# Patient Record
Sex: FEMALE | Race: WHITE | NOT HISPANIC OR LATINO | Employment: OTHER | URBAN - METROPOLITAN AREA
[De-identification: names, ages, dates, MRNs, and addresses within clinical notes are randomized per-mention and may not be internally consistent; named-entity substitution may affect disease eponyms.]

---

## 2019-07-09 ENCOUNTER — APPOINTMENT (OUTPATIENT)
Dept: CT IMAGING | Facility: HOSPITAL | Age: 84
End: 2019-07-09

## 2019-07-09 ENCOUNTER — HOSPITAL ENCOUNTER (EMERGENCY)
Facility: HOSPITAL | Age: 84
Discharge: HOME | End: 2019-07-09
Attending: EMERGENCY MEDICINE | Admitting: EMERGENCY MEDICINE

## 2019-07-09 VITALS
RESPIRATION RATE: 16 BRPM | OXYGEN SATURATION: 97 % | TEMPERATURE: 97.6 F | HEART RATE: 76 BPM | SYSTOLIC BLOOD PRESSURE: 180 MMHG | DIASTOLIC BLOOD PRESSURE: 84 MMHG

## 2019-07-09 DIAGNOSIS — S00.03XA CONTUSION OF SCALP, INITIAL ENCOUNTER: Primary | ICD-10-CM

## 2019-07-09 DIAGNOSIS — S16.1XXA STRAIN OF NECK MUSCLE, INITIAL ENCOUNTER: ICD-10-CM

## 2019-07-09 PROCEDURE — 70450 CT HEAD/BRAIN W/O DYE: CPT

## 2019-07-09 PROCEDURE — 72125 CT NECK SPINE W/O DYE: CPT

## 2019-07-09 PROCEDURE — 99283 EMERGENCY DEPT VISIT LOW MDM: CPT

## 2019-07-09 RX ORDER — AMLODIPINE BESYLATE 5 MG/1
5 TABLET ORAL DAILY
COMMUNITY

## 2019-07-09 RX ORDER — INSULIN GLARGINE 100 [IU]/ML
INJECTION, SOLUTION SUBCUTANEOUS DAILY
COMMUNITY

## 2019-07-09 RX ORDER — DIAPER,BRIEF,INFANT-TODD,DISP
EACH MISCELLANEOUS 2 TIMES DAILY
COMMUNITY

## 2019-07-09 RX ORDER — LOSARTAN POTASSIUM 25 MG/1
100 TABLET ORAL DAILY
COMMUNITY

## 2019-07-09 NOTE — DISCHARGE INSTRUCTIONS
Continue current home medications  Activity as tolerated  Follow up with pmd as needed  Return to er for fever, chills, vomiting, change in mental status, or any new or worsening symptoms

## 2019-07-09 NOTE — ED NOTES
Douglas called to Linda FABIAN? At [facility]. Discussed pt's current BP of 180/84. Linda states that they will accept the pt with this and treat when she arrives.      Joshua Oneill RN  07/09/19 0438

## 2019-07-09 NOTE — ED PROVIDER NOTES
EMERGENCY DEPARTMENT ENCOUNTER    CHIEF COMPLAINT  Chief Complaint: headache  History given by: pt, EMS  History limited by:dementia  Time Seen: 0045  Room Number: 27/27  PMD: Jesus Haq DO      HPI:  Pt is a 86 y.o. female who presents with a mild headache starting after two falls at 2000 and 2300 this evening, respectively. Her hx is limited by dementia. Per EMS, pt first fell out of her wheelchair and then out of her bed. Patient denies pain and all other complaints at this time.    Duration: 4 hours  Timing:cnstant  Location:head  Radiation:none  Quality:ache  Intensity/Severity:mild  Progression:unchanged  Associated Symptoms:unable to obtain  Aggravating Factors:unknown  Alleviating Factors:unknown  Previous Episodes:unknown  Treatment before arrival:unknown    PAST MEDICAL HISTORY  Active Ambulatory Problems     Diagnosis Date Noted   • No Active Ambulatory Problems     Resolved Ambulatory Problems     Diagnosis Date Noted   • No Resolved Ambulatory Problems     Past Medical History:   Diagnosis Date   • Diabetes mellitus (CMS/[facility])    • Hypertension        PAST SURGICAL HISTORY  History reviewed. No pertinent surgical history.    FAMILY HISTORY  History reviewed. No pertinent family history.    SOCIAL HISTORY  Social History     Socioeconomic History   • Marital status: Single     Spouse name: Not on file   • Number of children: Not on file   • Years of education: Not on file   • Highest education level: Not on file         ALLERGIES  Penicillins    REVIEW OF SYSTEMS  Review of Systems   Unable to perform ROS: Dementia   Neurological: Positive for headaches.       PHYSICAL EXAM  ED Triage Vitals [07/09/19 0016]   Temp Heart Rate Resp BP SpO2   97.6 °F (36.4 °C) 91 16 142/76 97 %       Physical Exam   Constitutional: She is well-developed, well-nourished, and in no distress.   Pleasantly confused.   HENT:   Head: Normocephalic.   Mouth/Throat: Mucous membranes are normal.   Eyes: No scleral icterus.    Neck: Normal range of motion.   No C, T, or L spine tenderness.   Cardiovascular: Normal rate, regular rhythm and normal heart sounds.   Pulmonary/Chest: Effort normal and breath sounds normal.   Musculoskeletal: Normal range of motion.   Neurological: She is alert.   Oriented only to person.   Skin: Skin is warm and dry.   Psychiatric: Mood and affect normal.   Nursing note and vitals reviewed.      LAB RESULTS  No results found for this or any previous visit (from the past 24 hour(s)).    I ordered the above labs and reviewed the results    RADIOLOGY  CT Cervical Spine Without Contrast   Final Result   1. No acute osseous finding       This report was finalized on 7/9/2019 1:35 AM by Garrett Nagel M.D.          CT Head Without Contrast   Final Result   1. No acute intracranial abnormality.                       This report was finalized on 7/9/2019 1:31 AM by Garrett Nagel M.D.              I ordered the above noted radiological studies and reviewed the images on the PACS system.        PROGRESS AND CONSULTS    0100. Reviewed pt's history and workup with Dr. Shore.  At bedside evaluation, they agree with the plan of care and discharge.     Reviewed implications of results, diagnosis, meds, responsibility to follow up, warning signs and symptoms of possible worsening, potential complications and reasons to return to ER with patient.  Discussed all results and noted any abnormalities with Nursing Home.           DIAGNOSIS  Final diagnoses:   Contusion of scalp, initial encounter   Strain of neck muscle, initial encounter       FOLLOW UP   Jesus Haq,   [address] [facility] [id number] [phone]    Schedule an appointment as soon as possible for a visit in 3 days  If symptoms worsen, As needed        COURSE & MEDICAL DECISION MAKING  Pertinent Imaging studies that were ordered and reviewed are noted above.  Results were reviewed/discussed with the patient and they were also made aware of online  assess.       MEDICATIONS GIVEN IN ER  Medications - No data to display    /76   Pulse 91   Temp 97.6 °F (36.4 °C) (Tympanic)   Resp 16   SpO2 97%       I personally reviewed the past medical history, past surgical history, social history, family history, current medications and allergies as they appear in this chart.  The scribe's note accurately reflects the work and decisions made by me.     Documentation assistance provided by alessandro Gilbert for PELON Hobbs on 7/9/2019 at 1:47 AM. Information recorded by the scribe was done at my direction and has been verified and validated by me.     Harriett Gilbert  07/09/19 0149       Jessica Snyder APRN  07/09/19 0239

## 2019-07-09 NOTE — ED PROVIDER NOTES
Pt presents to ED c/o mild neck pain that began earlier tonight when pt fell out of bed. Hx of dementia.    Upon exam, pt history is limited by her chronic dementia, she c/o neck pain, but there is no obvious deformity or signs of external trauma.    Discussed with pt plan to obtain CTs prior to disposition. Pt understands and agrees with the plan, all questions answered.    MD ATTESTATION NOTE    The GERMAN and I have discussed this patient's history, physical exam, and treatment plan.  I have reviewed the documentation and personally had a face to face interaction with the patient. I affirm the documentation and agree with the treatment and plan.  The attached note describes my personal findings.    Documentation assistance provided by alessandro Briggs for Dr. Shore.  Information recorded by the scribe was done at my direction and has been verified and validated by me.     Maribel Briggs  07/09/19 0135       Sean Shore MD  07/09/19 0314

## 2019-07-09 NOTE — ED TRIAGE NOTES
To ER via EMS from [facility].  Per staff, pt fell getting out of wheelchair at approx 2015.  Pt c/o head pain at this time.  Pt was observing pt.  Pt then fell out of bed at approx 2330 and again complained of head pain.  Facility decided to send resident in for evaluation.

## 2020-01-01 ENCOUNTER — HOSPITAL ENCOUNTER (EMERGENCY)
Facility: HOSPITAL | Age: 85
End: 2020-07-22
Attending: EMERGENCY MEDICINE | Admitting: EMERGENCY MEDICINE

## 2020-01-01 VITALS
RESPIRATION RATE: 32 BRPM | OXYGEN SATURATION: 87 % | TEMPERATURE: 97.6 F | SYSTOLIC BLOOD PRESSURE: 65 MMHG | DIASTOLIC BLOOD PRESSURE: 38 MMHG

## 2020-01-01 DIAGNOSIS — Z66 DNR (DO NOT RESUSCITATE): ICD-10-CM

## 2020-01-01 DIAGNOSIS — J96.00 ACUTE RESPIRATORY FAILURE DUE TO COVID-19 (HCC): Primary | ICD-10-CM

## 2020-01-01 DIAGNOSIS — U07.1 ACUTE RESPIRATORY FAILURE DUE TO COVID-19 (HCC): Primary | ICD-10-CM

## 2020-01-01 LAB

## 2020-01-01 PROCEDURE — 25010000002 MORPHINE PER 10 MG: Performed by: EMERGENCY MEDICINE

## 2020-01-01 PROCEDURE — 99285 EMERGENCY DEPT VISIT HI MDM: CPT

## 2020-01-01 PROCEDURE — 25010000002 ONDANSETRON PER 1 MG: Performed by: EMERGENCY MEDICINE

## 2020-01-01 PROCEDURE — 0202U NFCT DS 22 TRGT SARS-COV-2: CPT | Performed by: EMERGENCY MEDICINE

## 2020-01-01 PROCEDURE — 96374 THER/PROPH/DIAG INJ IV PUSH: CPT

## 2020-01-01 PROCEDURE — 96375 TX/PRO/DX INJ NEW DRUG ADDON: CPT

## 2020-01-01 RX ORDER — ACETAMINOPHEN 325 MG/1
650 TABLET ORAL EVERY 4 HOURS PRN
Status: CANCELLED | OUTPATIENT
Start: 2020-01-01

## 2020-01-01 RX ORDER — ONDANSETRON 2 MG/ML
4 INJECTION INTRAMUSCULAR; INTRAVENOUS ONCE
Status: COMPLETED | OUTPATIENT
Start: 2020-01-01 | End: 2020-01-01

## 2020-01-01 RX ORDER — MORPHINE SULFATE 20 MG/ML
20 SOLUTION ORAL
Status: CANCELLED | OUTPATIENT
Start: 2020-01-01 | End: 2020-08-01

## 2020-01-01 RX ORDER — LORAZEPAM 2 MG/ML
2 CONCENTRATE ORAL
Status: CANCELLED | OUTPATIENT
Start: 2020-01-01 | End: 2020-08-01

## 2020-01-01 RX ORDER — MORPHINE SULFATE 2 MG/ML
2 INJECTION, SOLUTION INTRAMUSCULAR; INTRAVENOUS
Status: CANCELLED | OUTPATIENT
Start: 2020-01-01 | End: 2020-08-01

## 2020-01-01 RX ORDER — MORPHINE SULFATE 20 MG/ML
5 SOLUTION ORAL
Status: CANCELLED | OUTPATIENT
Start: 2020-01-01 | End: 2020-08-01

## 2020-01-01 RX ORDER — MORPHINE SULFATE 2 MG/ML
4 INJECTION, SOLUTION INTRAMUSCULAR; INTRAVENOUS
Status: CANCELLED | OUTPATIENT
Start: 2020-01-01 | End: 2020-08-01

## 2020-01-01 RX ORDER — LORAZEPAM 2 MG/ML
0.5 CONCENTRATE ORAL
Status: CANCELLED | OUTPATIENT
Start: 2020-01-01 | End: 2020-08-01

## 2020-01-01 RX ORDER — MORPHINE SULFATE 2 MG/ML
4 INJECTION, SOLUTION INTRAMUSCULAR; INTRAVENOUS ONCE
Status: COMPLETED | OUTPATIENT
Start: 2020-01-01 | End: 2020-01-01

## 2020-01-01 RX ORDER — ACETAMINOPHEN 160 MG/5ML
650 SOLUTION ORAL EVERY 4 HOURS PRN
Status: CANCELLED | OUTPATIENT
Start: 2020-01-01

## 2020-01-01 RX ORDER — MORPHINE SULFATE 10 MG/ML
6 INJECTION INTRAMUSCULAR; INTRAVENOUS; SUBCUTANEOUS
Status: CANCELLED | OUTPATIENT
Start: 2020-01-01 | End: 2020-08-01

## 2020-01-01 RX ORDER — HYDROMORPHONE HYDROCHLORIDE 1 MG/ML
0.5 INJECTION, SOLUTION INTRAMUSCULAR; INTRAVENOUS; SUBCUTANEOUS
Status: CANCELLED | OUTPATIENT
Start: 2020-01-01 | End: 2020-08-01

## 2020-01-01 RX ORDER — LORAZEPAM 2 MG/ML
2 INJECTION INTRAMUSCULAR
Status: CANCELLED | OUTPATIENT
Start: 2020-01-01 | End: 2020-08-01

## 2020-01-01 RX ORDER — LORAZEPAM 2 MG/ML
0.5 INJECTION INTRAMUSCULAR
Status: CANCELLED | OUTPATIENT
Start: 2020-01-01 | End: 2020-08-01

## 2020-01-01 RX ORDER — ACETAMINOPHEN 650 MG/1
650 SUPPOSITORY RECTAL EVERY 4 HOURS PRN
Status: CANCELLED | OUTPATIENT
Start: 2020-01-01

## 2020-01-01 RX ORDER — DIPHENOXYLATE HYDROCHLORIDE AND ATROPINE SULFATE 2.5; .025 MG/1; MG/1
1 TABLET ORAL
Status: CANCELLED | OUTPATIENT
Start: 2020-01-01

## 2020-01-01 RX ORDER — LORAZEPAM 2 MG/ML
1 CONCENTRATE ORAL
Status: CANCELLED | OUTPATIENT
Start: 2020-01-01 | End: 2020-08-01

## 2020-01-01 RX ORDER — LORAZEPAM 2 MG/ML
1 INJECTION INTRAMUSCULAR
Status: CANCELLED | OUTPATIENT
Start: 2020-01-01 | End: 2020-08-01

## 2020-01-01 RX ORDER — MORPHINE SULFATE 20 MG/ML
10 SOLUTION ORAL
Status: CANCELLED | OUTPATIENT
Start: 2020-01-01 | End: 2020-08-01

## 2020-01-01 RX ADMIN — MORPHINE SULFATE 4 MG: 2 INJECTION, SOLUTION INTRAMUSCULAR; INTRAVENOUS at 10:14

## 2020-01-01 RX ADMIN — ONDANSETRON 4 MG: 2 INJECTION INTRAMUSCULAR; INTRAVENOUS at 10:14

## 2020-07-22 PROBLEM — J96.00 ACUTE RESPIRATORY FAILURE DUE TO COVID-19 (HCC): Status: ACTIVE | Noted: 2020-01-01

## 2020-07-22 PROBLEM — U07.1 ACUTE RESPIRATORY FAILURE DUE TO COVID-19 (HCC): Status: ACTIVE | Noted: 2020-01-01

## 2020-07-22 NOTE — ED NOTES
Notified Neal Smith of pt expiration.  Spoke with Mary and left message with Elsy Ayers at [facility] facility     Adelita Mora RN  07/22/20 2633

## 2020-07-22 NOTE — ED PROVIDER NOTES
EMERGENCY DEPARTMENT ENCOUNTER    Room Number:  18/18  Date of encounter:  7/22/2020  PCP: Jesus Haq DO  Historian: EMS      HPI:  Chief Complaint: respiratory distress    A complete HPI/ROS/PMH/PSH/SH/FH are unobtainable due to: pt condition    Context: Sheree Maza is a 87 y.o. female with hx of dementia who presents to the ED from the NH via EMS c/o respiratory distress.  Oxygen saturations and blood pressure was in the 60s upon arrival.  Pt is a DNR.        PAST MEDICAL HISTORY  Active Ambulatory Problems     Diagnosis Date Noted   • No Active Ambulatory Problems     Resolved Ambulatory Problems     Diagnosis Date Noted   • No Resolved Ambulatory Problems     Past Medical History:   Diagnosis Date   • COVID-19    • Diabetes mellitus (CMS/HCC)    • Hypertension          PAST SURGICAL HISTORY  History reviewed. No pertinent surgical history.      FAMILY HISTORY  History reviewed. No pertinent family history.      SOCIAL HISTORY  Social History     Socioeconomic History   • Marital status: Single     Spouse name: Not on file   • Number of children: Not on file   • Years of education: Not on file   • Highest education level: Not on file         ALLERGIES  Penicillins        REVIEW OF SYSTEMS  Review of Systems   Unable to perform ROS: Acuity of condition       All systems reviewed and negative except for those discussed in HPI.     PHYSICAL EXAM    I have reviewed the triage vital signs and nursing notes.    ED Triage Vitals   Temp Pulse Resp BP SpO2   -- -- -- -- --      Temp src Heart Rate Source Patient Position BP Location FiO2 (%)   -- -- -- -- --       GENERAL: Pt is unresponsive and in severe respiratory distress  HENT: NCAT, neck supple, trachea midline, dry mucous membranes and cracked lips  EYES: no scleral icterus, pupils are 5mm, she has a R-sided gaze preference  CV: regular rhythm, tachycardic rate in the 130s, no murmur  RESPIRATORY: rhonchi and decreased breath sounds in all lung  fields  ABDOMEN: soft, non-distended, bowel sounds present  MUSCULOSKELETAL: no gross deformity, no pedal edema  NEURO: no response to painful stimuli  SKIN: BLE are cool and cyanotic  PSYCH:  Unresponsive      PPE  Patient was placed in face mask in first look. Patient was wearing an oxygen facemask when I entered the room and throughout our encounter. I wore full protective equipment throughout this patient encounter including a CAPR  face mask eye shield, gown and gloves. Hand hygiene was performed before donning protective equipment and after removal when leaving the room.      Vital signs and nursing notes reviewed.      LAB RESULTS  No results found for this or any previous visit (from the past 24 hour(s)).    Ordered the above labs and independently reviewed the results.        RADIOLOGY  No Radiology Exams Resulted Within Past 24 Hours    I ordered the above noted radiological studies. Independently reviewed by me and discussed with radiologist.  See dictation above for official radiology interpretation.      PROCEDURES    Procedures        MEDICATIONS GIVEN IN ER    Medications   morphine injection 4 mg (4 mg Intravenous Given 7/22/20 1014)   ondansetron (ZOFRAN) injection 4 mg (4 mg Intravenous Given 7/22/20 1014)         PROGRESS, DATA ANALYSIS, CONSULTS, AND MEDICAL DECISION MAKING    All labs have been independently reviewed by me.  All radiology studies have been reviewed by me and discussed with radiologist dictating report.   EKG's independently reviewed by me.  Discussion below represents my analysis of pertinent findings related to patient's condition, differential diagnosis, treatment plan and final disposition.    0950:  Attempted to call POA but was sent directly to voicemail.      0955:  Spoke to Michelle Adhikarikman, the pt's sister and POA on the phone who states that pt is positive for COVID-19 and has been in a COVID-19 unit for the past week.  She confirms that pt is a DNR and that she would like  comfort care only moving forward.      1007:  Ordered Morphine and Zofran.      1013: Received call from Dr. Franco and discussed pt's case.  Dr. Franco will accept the pt to palliative care.  Pt will remain in isolation in the ED until being taken to palliative care.      1227: Rechecked the patient.  Pt is apneic and pulsless in asystole.  Time of death 1227.  Called pt's sister and informed her that pt has .       DIAGNOSIS  Final diagnoses:   Acute respiratory failure due to COVID-19 (CMS/[facility])   DNR (do not resuscitate)         DISPOSITION         EMR Dragon/Transcription disclaimer:   Much of this encounter note is an electronic transcription/translation of spoken language to printed text. The electronic translation of spoken language may permit erroneous, or at times, nonsensical words or phrases to be inadvertently transcribed; Although I have reviewed the note for such errors, some may still exist.       --  Documentation assistance provided by alessandro Retana for Dr. Jeff MD.  Information recorded by the scribe was done at my direction and has been verified and validated by me.         Isaak Retana  20 [phone]       Raad Aguilar MD  20 [phone]

## 2020-07-22 NOTE — ED NOTES
Patient arrived via [facility] with a 15L NRB in place. She was found to have SPO2 of 64% upon EMS arrival. Patient is a DNR and was sent with proper paperwork. Dr. Aguilar attempted to call next of kin and it went directly to voicemail. RUI Becerra, Dr. Aguilar, and RUI Chavez were in room with proper PPE including gowns, gloves, and CAPRs.      Lisa Kaba RN  07/22/20 0458       Lisa Kaba RN  07/22/20 4128

## 2020-07-22 NOTE — ED NOTES
Pt noted to have mask on when this RN entered the room.  This RN and PEPPER Jones RN  wore CAPR and full PPE  throughout our encounter with pt.  Hand hygiene performed upon entering and exiting room       Adelita Mora RN  07/22/20 2867
